# Patient Record
Sex: MALE | Race: OTHER | HISPANIC OR LATINO | URBAN - METROPOLITAN AREA
[De-identification: names, ages, dates, MRNs, and addresses within clinical notes are randomized per-mention and may not be internally consistent; named-entity substitution may affect disease eponyms.]

---

## 2023-07-23 ENCOUNTER — EMERGENCY (EMERGENCY)
Facility: HOSPITAL | Age: 33
LOS: 1 days | Discharge: HOME | End: 2023-07-23
Attending: EMERGENCY MEDICINE
Payer: COMMERCIAL

## 2023-07-23 VITALS
RESPIRATION RATE: 16 BRPM | WEIGHT: 188.94 LBS | SYSTOLIC BLOOD PRESSURE: 124 MMHG | HEIGHT: 72 IN | HEART RATE: 79 BPM | TEMPERATURE: 98 F | DIASTOLIC BLOOD PRESSURE: 80 MMHG | OXYGEN SATURATION: 98 %

## 2023-07-23 PROCEDURE — 73630 X-RAY EXAM OF FOOT: CPT

## 2023-07-23 PROCEDURE — 28470 CLTX METATARSAL FX WO MNP EA: CPT | Mod: 54

## 2023-07-23 PROCEDURE — 73630 X-RAY EXAM OF FOOT: CPT | Mod: 26,RT

## 2023-07-23 PROCEDURE — 99284 EMERGENCY DEPT VISIT MOD MDM: CPT

## 2023-07-23 PROCEDURE — 73610 X-RAY EXAM OF ANKLE: CPT | Mod: 26,RT

## 2023-07-23 PROCEDURE — 99284 EMERGENCY DEPT VISIT MOD MDM: CPT | Mod: 57

## 2023-07-23 PROCEDURE — 73610 X-RAY EXAM OF ANKLE: CPT

## 2023-07-23 RX ORDER — IBUPROFEN 200 MG
600 TABLET ORAL ONCE
Refills: 0 | Status: COMPLETED | OUTPATIENT
Start: 2023-07-23 | End: 2023-07-23

## 2023-07-23 RX ADMIN — Medication 600 MILLIGRAM(S): at 20:50

## 2023-07-23 NOTE — ED PROVIDER NOTE - PATIENT PORTAL LINK FT
You can access the FollowMyHealth Patient Portal offered by [facility] by registering at the following website: [email or web address]. By joining [employer]’s FollowMyHealth portal, you will also be able to view your health information using other applications (apps) compatible with our system.
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
## 2023-07-23 NOTE — ED ADULT NURSE NOTE - NSFALLRISKINTERV_ED_ALL_ED

## 2023-07-23 NOTE — ED PROVIDER NOTE - MUSCULOSKELETAL, MLM
No pelvic instability. TTP on the right 4th, 5th metatarsal and the lateral malleolus with moderate swelling and hematoma. Full range of motion on all extremities.
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
## 2023-07-23 NOTE — ED PROVIDER NOTE - NSFOLLOWUPINSTRUCTIONS_ED_ALL_ED_FT
please use heat packs to area 3x/day 20 mins each session, take motrin 600mg every 6 hrs as needed, tylenol 650mg every 4 hrs as needed, stay active, no heavy lifting and return if symptoms  worsens. see podiatry  take pains meds as needed. ibuprofen for mild and moderate pain. see podiatrist or orthopedic surgeon in 1-2 week for repeat xr. no weight bearing until doctor say ok. use crutches in meantime. if pain uncontrolled with medication, toes looks blue, can't move extremity then come to er immediately. do not get it wet or stick anything in the splint. elevate extremity.

## 2023-07-23 NOTE — ED PROVIDER NOTE - CLINICAL SUMMARY MEDICAL DECISION MAKING FREE TEXT BOX
Will rule out fracture vs severe contusion. Will obtain x-ray, provide pain meds and ACE wrap. Will also provide podiatry follow up.

## 2023-07-27 PROBLEM — Z00.00 ENCOUNTER FOR PREVENTIVE HEALTH EXAMINATION: Status: ACTIVE | Noted: 2023-07-27
